# Patient Record
Sex: MALE | Race: WHITE | NOT HISPANIC OR LATINO | URBAN - METROPOLITAN AREA
[De-identification: names, ages, dates, MRNs, and addresses within clinical notes are randomized per-mention and may not be internally consistent; named-entity substitution may affect disease eponyms.]

---

## 2018-03-19 ENCOUNTER — EMERGENCY (EMERGENCY)
Facility: HOSPITAL | Age: 63
LOS: 1 days | Discharge: HOME | End: 2018-03-19
Attending: EMERGENCY MEDICINE | Admitting: EMERGENCY MEDICINE
Payer: SELF-PAY

## 2018-03-19 VITALS
WEIGHT: 175.05 LBS | HEART RATE: 87 BPM | RESPIRATION RATE: 17 BRPM | OXYGEN SATURATION: 96 % | SYSTOLIC BLOOD PRESSURE: 143 MMHG | DIASTOLIC BLOOD PRESSURE: 80 MMHG | TEMPERATURE: 98 F

## 2018-03-19 DIAGNOSIS — F11.10 OPIOID ABUSE, UNCOMPLICATED: ICD-10-CM

## 2018-03-19 DIAGNOSIS — F17.210 NICOTINE DEPENDENCE, CIGARETTES, UNCOMPLICATED: ICD-10-CM

## 2018-03-19 PROCEDURE — 99283 EMERGENCY DEPT VISIT LOW MDM: CPT

## 2018-03-19 NOTE — ED ADULT NURSE NOTE - CHPI ED SYMPTOMS NEG
no disorientation/no vomiting/no weakness/no abdominal distension/no pain/no confusion/no nausea/no fever/no chills/no abdominal pain

## 2018-03-19 NOTE — ED PROVIDER NOTE - SKIN WOUND TYPE
Healing 1 cm laceration on dorsal surface of right hand over PIP joint of finger, no signs of infection/LACERATION(S)

## 2018-03-19 NOTE — ED PROVIDER NOTE - CONSTITUTIONAL, MLM
normal... Slightly unkempt, awake, alert, oriented to person, place, time/situation and in no apparent distress, Cooperative, Does not appear intoxicated

## 2018-03-19 NOTE — ED ADULT TRIAGE NOTE - CHIEF COMPLAINT QUOTE
admits to buying methadone off the street- was found on the street, denies alcohol or other recreational drugs, pt is awake and alert responsive to voice

## 2018-03-19 NOTE — ED PROVIDER NOTE - OBJECTIVE STATEMENT
63 y/o male with history of Drug abuse (Heroin by snorting and IV; Methadone), cigarette smoking, presents today BIBA for somnolence after Methadone use. He was not given Narcan prior to ED arrival. Patient bought Methadone from street, states he injected approximately 60-70 mg dose of Methadone today approximately 4 hours ago. Notes that this dose is not new for him. He occasionally uses Heroin, denies daily use. Patient admits to smoking. No other illicit drug use noted. Denies EtOH consumption. Patient states he currently feels back to baseline. He currently only notes laceration on dorsal side of right hand. No other complaints at this time. 61 y/o male with history of Drug abuse (Heroin by snorting and IV; Methadone), cigarette smoking, presents today BIBA for somnolence after Methadone use. He was not given Narcan prior to ED arrival. Patient bought Methadone from street, states he ingested approximately 60-70 mg dose of Methadone today approximately 4 hours ago. Notes that this dose is not new for him. He occasionally uses Heroin, denies daily use. Patient admits to smoking. No other illicit drug use noted. Denies EtOH consumption. Patient states he currently feels back to baseline. He currently only notes laceration on dorsal side of right hand. No other complaints at this time.

## 2018-03-19 NOTE — ED PROVIDER NOTE - MEDICAL DECISION MAKING DETAILS
Patient presenting after Methadone injection with sleepiness, dose approximately 4 hours ago. Patient feels stable for discharge. No reversal agents used. Will discharge patient with a tube of Bacitracin. Patient presenting after Methadone ingestion with sleepiness PTA, dose approximately 4 hours ago. Patient feels stable for discharge. No reversal agents used. Will discharge patient with a tube of Bacitracin.